# Patient Record
Sex: MALE | Race: WHITE | NOT HISPANIC OR LATINO | URBAN - METROPOLITAN AREA
[De-identification: names, ages, dates, MRNs, and addresses within clinical notes are randomized per-mention and may not be internally consistent; named-entity substitution may affect disease eponyms.]

---

## 2024-02-11 ENCOUNTER — HOSPITAL ENCOUNTER (EMERGENCY)
Facility: MEDICAL CENTER | Age: 49
End: 2024-02-11
Attending: EMERGENCY MEDICINE

## 2024-02-11 VITALS
RESPIRATION RATE: 20 BRPM | WEIGHT: 200 LBS | OXYGEN SATURATION: 91 % | HEART RATE: 108 BPM | SYSTOLIC BLOOD PRESSURE: 139 MMHG | DIASTOLIC BLOOD PRESSURE: 91 MMHG | TEMPERATURE: 98.8 F

## 2024-02-11 DIAGNOSIS — S60.819A ABRASION, WRIST W/O INFECTION: ICD-10-CM

## 2024-02-11 DIAGNOSIS — Z00.8 MEDICAL CLEARANCE FOR INCARCERATION: ICD-10-CM

## 2024-02-11 DIAGNOSIS — F10.929 ALCOHOLIC INTOXICATION WITH COMPLICATION (HCC): ICD-10-CM

## 2024-02-11 PROCEDURE — 700111 HCHG RX REV CODE 636 W/ 250 OVERRIDE (IP)

## 2024-02-11 PROCEDURE — 90715 TDAP VACCINE 7 YRS/> IM: CPT

## 2024-02-11 PROCEDURE — 90471 IMMUNIZATION ADMIN: CPT

## 2024-02-11 PROCEDURE — 99284 EMERGENCY DEPT VISIT MOD MDM: CPT

## 2024-02-11 RX ADMIN — CLOSTRIDIUM TETANI TOXOID ANTIGEN (FORMALDEHYDE INACTIVATED), CORYNEBACTERIUM DIPHTHERIAE TOXOID ANTIGEN (FORMALDEHYDE INACTIVATED), BORDETELLA PERTUSSIS TOXOID ANTIGEN (GLUTARALDEHYDE INACTIVATED), BORDETELLA PERTUSSIS FILAMENTOUS HEMAGGLUTININ ANTIGEN (FORMALDEHYDE INACTIVATED), BORDETELLA PERTUSSIS PERTACTIN ANTIGEN, AND BORDETELLA PERTUSSIS FIMBRIAE 2/3 ANTIGEN 0.5 ML: 5; 2; 2.5; 5; 3; 5 INJECTION, SUSPENSION INTRAMUSCULAR at 23:42

## 2024-02-12 NOTE — ED NOTES
Reviewed discharge instructions, pt verbalized understanding of follow up with PCP. Pt encouraged to return to the ED for fever, vomiting, SOB, or any other new/concerning symptoms.

## 2024-02-12 NOTE — ED PROVIDER NOTES
ED Provider Note    Scribed for Dr. Perkins by Hilaria Torres. 2/11/2024,  11:15 PM.      CHIEF COMPLAINT  Chief Complaint   Patient presents with    Medical Clearance     Pt BIB PD for medical clearance for a dog scratch to the R wrist x2 weeks ago. Pt denies fever, chills, or vomiting.       HPI  LIMITATION TO HISTORY   Select: : None    Bobo Rg is a 48 y.o. male who presents to the Emergency Department for evaluation dog scratch onset 2 weeks ago. The patient explains he normally plays rough with his dog and 2 weeks ago he sustained a scratch to his right wrist. He denies fever, chills or vomiting. He notes his dog's behavior has not changed and is acting normal. The patient reports that his tetanus shot is updated.     REVIEW OF SYSTEMS  See HPI for further details. All other systems are negative.     PAST MEDICAL HISTORY   History reviewed. No pertinent past medical history.    SURGICAL HISTORY  History reviewed. No pertinent surgical history.    FAMILY HISTORY  History reviewed. No pertinent family history.    SOCIAL HISTORY    reports that he has never smoked. He has never used smokeless tobacco. He reports current alcohol use. He reports that he does not currently use drugs.    CURRENT MEDICATIONS  Home Medications       Reviewed by Rene Godinez R.N. (Registered Nurse) on 02/11/24 at 2310  Med List Status: Partial     Medication Last Dose Status        Patient Alexander Taking any Medications                           ALLERGIES  Allergies   Allergen Reactions    Iodine Contrast [Diagnostic X-Ray Materials] Anaphylaxis       PHYSICAL EXAM  VITAL SIGNS: BP (!) 139/91   Pulse (!) 113   Temp 37.2 °C (98.9 °F) (Temporal)   Resp 20   SpO2 93%   Gen: Alert, no acute distress  HEENT: ATNC  Eyes: PERRL, EOMI, normal conjunctiva  Neck: trachea midline  Resp: no respiratory distress, lungs clear  CV: No JVD, slightly tachycardic no murmurs  Abd: non-distended. Soft, nontender  Ext: No deformities, abrasion on  the dorsum to the right wrist, no surrounding erythema, no drainage, no active bleeding.   Neuro: speech fluent    COURSE & MEDICAL DECISION MAKING  Pertinent Labs & Imaging studies were reviewed. (See chart for details)    ED Observation Status? No  -----------    11:15 PM Patient seen and examined at bedside. Patient was present at the ED for medical clearance on a scratch he sustained from his dog two weeks ago. I informed the patient to soak their hand in water and to keep it clean and their scratch should not be of concern. Patient had the opportunity to ask any questions. The plan for discharge was discussed with them and they were told to return for any new or worsening symptoms. He was also informed of the plans for follow up. Patient is understanding and agreeable to the plan for discharge.      INITIAL ASSESSMENT AND PLAN  Medical Decision Making: Patient presents for medical clearance.  Patient arrives with police intoxicated, however there is concern for wound to the right wrist.  This appears to be a prior scab that may have recently reopened but is not currently bleeding.  No evidence of infection.  The patient reports he is up-to-date on tetanus.  Any dog involvement was his own dog, and he denies any bites from the dog.  The dog does not report have any signs that would suggest that the dog is rabid, no indication for rabies prophylaxis at this time.  No indication for antibiotics.  The patient has no other medical complaints at this time, patient is medically cleared.    ADDITIONAL PROBLEM LIST AND DISPOSITION      Escalation of care considered, and ultimately not performed: blood analysis.     Barriers to care at this time, including but not limited to: Patient does not have established PCP.     Decision tools and prescription drugs considered including, but not limited to: Antibiotics see above .    The patient will return for new or worsening symptoms and is stable at the time of  discharge.    The patient is referred to a primary physician for blood pressure management, diabetic screening, and for all other preventative health concerns.    DISPOSITION:  Patient will be discharged home in stable condition.    FOLLOW UP:  [facility], Emergency Dept  [address] [facility] [address] [phone]    If symptoms worsen    Your regular doctor.  To establish a primary care provider within our system, please call [phone]              FINAL IMPRESSION  1. Medical clearance for incarceration    2. Alcoholic intoxication with complication (HCC)    3. Abrasion, wrist w/o infection           Hilaria LING (Scribe), jules scribing for, and in the presence of, Himanshu Perkins M.D..    Electronically signed by: Hilaria Torres (Pham), 2/11/2024    IHimanshu M.D. personally performed the services described in this documentation, as scribed by Hilaria Torres in my presence, and it is both accurate and complete.    The note accurately reflects work and decisions made by me.  Himanshu Perkins M.D.  2/11/2024  11:30 PM      This dictation was created using voice recognition software. The accuracy of the dictation is limited to the abilities of the software. I expect there may be some errors of grammar and possibly content. The nursing notes were reviewed and certain aspects of this information were incorporated into this note.

## 2024-02-12 NOTE — ED TRIAGE NOTES
Chief Complaint   Patient presents with    Medical Clearance     Pt BIB PD for medical clearance for a dog scratch to the R wrist x2 weeks ago. Pt denies fever, chills, or vomiting.   BP (!) 139/91   Pulse (!) 113   Temp 37.2 °C (98.9 °F) (Temporal)   Resp 20   SpO2 93%      Pt arrives with PD. Questionable ETOH, pt not forthcoming with PD in room. Pt with scratch noted to R wrist.